# Patient Record
Sex: MALE | Race: WHITE | URBAN - METROPOLITAN AREA
[De-identification: names, ages, dates, MRNs, and addresses within clinical notes are randomized per-mention and may not be internally consistent; named-entity substitution may affect disease eponyms.]

---

## 2017-02-06 ENCOUNTER — IMPORTED ENCOUNTER (OUTPATIENT)
Dept: URBAN - METROPOLITAN AREA CLINIC 50 | Facility: CLINIC | Age: 72
End: 2017-02-06

## 2017-05-05 NOTE — PATIENT DISCUSSION
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 - Plan : Recommend starting Zirgan OU TID. Continue using until next visit. Sample given to patient. RV 1 Week for Follow Up. Sooner if having problems or changes in vision.

## 2017-05-05 NOTE — PATIENT DISCUSSION
(H25.013) Cortical age-related cataract, bilateral - Assesment : Examination revealed Cortical Senile Cataract. With cortical spoking OS. Cataract related changes vs. history amblyopia OS. - Plan : Monitor for changes. Advised patient to call our office with decreased vision or increased symptoms.

## 2017-05-12 NOTE — PATIENT DISCUSSION
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 Cornea clear today OS. - Plan : Recommend d/c Zirgan. Monitor for changes.    RV 1 year Exam.

## 2017-09-26 ENCOUNTER — IMPORTED ENCOUNTER (OUTPATIENT)
Dept: URBAN - METROPOLITAN AREA CLINIC 50 | Facility: CLINIC | Age: 72
End: 2017-09-26

## 2017-10-25 ENCOUNTER — IMPORTED ENCOUNTER (OUTPATIENT)
Dept: URBAN - METROPOLITAN AREA CLINIC 50 | Facility: CLINIC | Age: 72
End: 2017-10-25

## 2018-01-16 NOTE — PATIENT DISCUSSION
1.  Recurrent Erosion of Cornea OS - pain upon opening eyes. Giovani 128ung qhs2. Reviewed ABMD and risk of corneal erosions and if central visual distortion. Recommend clinical observation artificial tear lubrication during the day and Giovani 128ung qhs3. Dry Eye OU:  Continue current management with Artificial Tears. 4.  Cataract OU: Explained how cataracts can effect vision. Recommend clinical observation. The patient was advised to contact us if any change or worsening of vision. 5. Return for an appointment in 1 week for office call. with Dr. Anna Nuñez.

## 2018-01-23 NOTE — PATIENT DISCUSSION
1.  Recurrent Erosion of Cornea OS - Much improved on drops and Giovani 128ung qhs IF fails ointment will need SK2. Reviewed ABMD and risk of corneal erosions and if central visual distortion. Recommend clinical observation artificial tear lubrication during the day and Giovani 128ung qhs3. Dry Eye OU:  Continue current management with Artificial Tears. 4.  Cataract OU: Explained how cataracts can effect vision. Recommend clinical observation. The patient was advised to contact us if any change or worsening of vision. Return for an appointment in 3 months for office call. with Dr. Roxanne Lizarraga.

## 2018-02-09 ENCOUNTER — IMPORTED ENCOUNTER (OUTPATIENT)
Dept: URBAN - METROPOLITAN AREA CLINIC 50 | Facility: CLINIC | Age: 73
End: 2018-02-09

## 2018-03-16 NOTE — PATIENT DISCUSSION
1.  Pt is symptomatic despite using lexi qhs. Discussed the risks benefits and alternatives to superficial keratectomy including infection success rate and recurrence. Schedule if desired. SK with miya sulaiman. Valium 10 mg.2. Reviewed ABMD and risk of corneal erosions and if central visual distortion. Recommend clinical observation artificial tear lubrication during the day and Giovani 128ung qhs3. Return for an appointment for Minor Procedure. with Dr. Lynsey Staley.

## 2018-03-26 NOTE — PATIENT DISCUSSION
Pt is symptomatic despite using lexi qhs. Discussed the risks benefits and alternatives to superficial keratectomy including infection success rate and recurrence. Pt tolerated procedure well. F/u drops and instructions reviewed with . Besivance lotemax prolensa tearsReturn for an appointment for post op exam. with Dr. Juan C Hancock.

## 2018-04-03 NOTE — PATIENT DISCUSSION
Post Operative: Doing well po drops as instructed tears prn. Call with any problems. DC Prolensa and besivance lotemax 2x/d for a week then stop NaCl lexi qhs for 3 monthsReturn for an appointment in 3 weeks for post op exam. MRx. with Dr. Thiago Staley.

## 2018-04-23 NOTE — PATIENT DISCUSSION
Post Operative: Doing well tears 2-3x/d NaCl lexi qhsCataract no improvement with refraction t/c surgery if worsens. Return for an appointment in 3 months for office call MRX. with Dr. Paola Castanon.

## 2018-05-04 NOTE — PATIENT DISCUSSION
Post Operative: Doing well surface improving NaCl OS lexi trial dc in 1-2 months. tears prn. Call with any problems. Return for an appointment in 3 months for office call. with Dr. Rodrick Peñaloza.

## 2018-08-10 NOTE — PATIENT DISCUSSION
Cataract OS: Explained how cataracts can effect vision. Recommend clinical observation. The patient was advised to contact us if any change or worsening of vision.

## 2018-08-10 NOTE — PATIENT DISCUSSION
Recurrent Erosion of Cornea OS - pain upon opening eyes improved greatly after SK but still with some mild irritation when opening eyes. Would restart  Giovani 128ung qhs for 1-2 months.

## 2019-04-04 ENCOUNTER — IMPORTED ENCOUNTER (OUTPATIENT)
Dept: URBAN - METROPOLITAN AREA CLINIC 50 | Facility: CLINIC | Age: 74
End: 2019-04-04

## 2019-05-23 NOTE — PATIENT DISCUSSION
1.  Recurrent Erosion of Cornea OD -  pain upon opening eyes. Giovani 128ung qhs gel drops during day. Corneal eval OD with Dr. Deo Moore. T/C SK OD. 2. Combined Types of Cataract OU: Explained how cataracts can effect vision. Recommend clinical observation. The patient was advised to contact us if any change or worsening of vision.

## 2019-06-18 NOTE — PATIENT DISCUSSION
1.  Pt is symptomatic despite using lexi qhs. Discussed the risks benefits and alternatives to superficial keratectomy including infection success rate and recurrence. Schedule if desired. SK OD with miya sulaiman polishing of surface2. Recurrent Erosion of Cornea OU - pain upon opening eyes. Better os since SK last year  but still with mild symptoms. Restart Giovani 128ung qhs OS3. Combined Types of Cataract OU: Explained how cataracts can effect vision. Recommend clinical observation. The patient was advised to contact us if any change or worsening of vision. 4. Dry Eye OU:  Continue current management with Artificial Tears.

## 2019-07-02 NOTE — PATIENT DISCUSSION
Post Operative: Doing well po drops as instructed. tears prn Call with any problems. Doing well after SK DC besivance prolensa and Lotemax. Tears 4x/d Giovani 128ung qhs ou. Pt may need lexi chronic she has mild symptoms OS after SK without lexi. Return for an appointment in 1 month for post op exam. with Dr. Tracy Mcmanus.

## 2020-03-09 ENCOUNTER — IMPORTED ENCOUNTER (OUTPATIENT)
Dept: URBAN - METROPOLITAN AREA CLINIC 50 | Facility: CLINIC | Age: 75
End: 2020-03-09

## 2020-10-29 NOTE — PATIENT DISCUSSION
1. Combined Types of Cataract OU: Explained how cataracts can effect vision. Recommend clinical observation. The patient was advised to contact us if any change or worsening of vision. 2. Dry Eye OU:  Continue current management with Artificial Tears. 3.  Recurrent Erosion of Cornea OS - pain upon opening eyes. Giovani Carmonaung qhs hs. Eval with Dr. Sidra Shields as still symptomatic with small chronic defect.  4.  Refractive error Glasses change optional.

## 2021-04-02 ENCOUNTER — IMPORTED ENCOUNTER (OUTPATIENT)
Dept: URBAN - METROPOLITAN AREA CLINIC 50 | Facility: CLINIC | Age: 76
End: 2021-04-02

## 2021-06-14 ASSESSMENT — VISUAL ACUITY
OD_OTHER: 20/50. 20/70.
OS_BAT: 20/40
OS_CC: 20/20-1
OD_CC: 20/20-1
OD_OTHER: 20/20. 20/25.
OD_OTHER: 20/30.
OS_CC: 20/20
OS_CC: 20/25
OD_CC: 20/20
OS_OTHER: 20/25. 20/25.
OS_OTHER: 20/80. 20/100.
OS_BAT: 20/25
OS_BAT: 20/100
OD_CC: J1+
OD_CC: 20/25-
OD_OTHER: 20/80. 20/100.
OD_CC: 20/20
OD_CC: 20/20-
OS_OTHER: 20/100. >20/400.
OD_CC: 20/20
OS_BAT: 20/80
OD_OTHER: 20/70. 20/80.
OD_BAT: 20/20
OD_CC: 20/20
OS_CC: 20/25
OS_CC: 20/25-
OD_BAT: 20/50
OS_CC: J1+
OS_OTHER: 20/40. 20/50.
OS_CC: 20/30+
OD_CC: J1+
OS_OTHER: 20/40.
OS_OTHER: 20/25. 20/40.
OD_BAT: 20/20
OS_CC: J1+
OD_CC: J1+
OS_BAT: 20/25
OS_CC: 20/20
OS_CC: J1+
OD_BAT: 20/80
OD_OTHER: 20/20. 20/25.
OD_BAT: 20/70

## 2021-06-14 ASSESSMENT — TONOMETRY
OS_IOP_MMHG: 12
OD_IOP_MMHG: 12
OD_IOP_MMHG: 13
OS_IOP_MMHG: 14
OS_IOP_MMHG: 11
OD_IOP_MMHG: 14
OD_IOP_MMHG: 14
OS_IOP_MMHG: 14
OS_IOP_MMHG: 12
OS_IOP_MMHG: 14
OD_IOP_MMHG: 13
OD_IOP_MMHG: 13
OS_IOP_MMHG: 15
OD_IOP_MMHG: 12

## 2021-10-14 NOTE — PATIENT DISCUSSION
Reviewed ABMD and risk of corneal erosions and if central visual distortion.   Recommend clinical observation artificial tear lubrication during the day and Giovani 128ung qhs

## 2022-01-13 NOTE — PATIENT DISCUSSION
1.  Dry Eye OU/ Corneal erosions:  Continue current management with Gels and Giovani 128. F/U with Dr. Deo Moore.   2.  Combined Types of Cataract OS: Cat eval OS/OD with Dr. Deo Moore

## 2022-03-10 NOTE — PATIENT DISCUSSION
1.  Discussed the risks benefits alternatives and limitations of cataract surgery including infection bleeding loss of vision retinal tears detachment. The patient stated a full understanding and a desire to proceed with the procedure in both eyes. Refractive options were reviewed. Patient has elected to be optimized for distance vision in both eyes. The patient will still need glasses for reading and to possibly fine tune distance vision. Sched KPEIOL OS/OD 2. Dry Eye OU:  Continue current management with Artificial Tears. 3.  S/P SK for ABMD with stable surface4. Followup for Admit. with Dr. Rodrick Peñaloza.

## 2022-04-11 NOTE — PATIENT DISCUSSION
The patient feels that the cataract is significantly impacting daily activities and has elected cataract surgery. The risks, benefits, and alternatives to surgery were discussed. The patient elects to proceed with surgery. Pt declines toric will wear glasses for best vision.

## 2022-05-04 NOTE — PATIENT DISCUSSION
Recurrent Erosion of Cornea OD -  Bandage CL. Tobramycin sol at least QID. F/U 3 days. Spinal complete at 0942 per jacquelyn hall

## 2023-01-30 ENCOUNTER — COMPREHENSIVE EXAM (OUTPATIENT)
Dept: URBAN - METROPOLITAN AREA CLINIC 53 | Facility: CLINIC | Age: 78
End: 2023-01-30

## 2023-01-30 DIAGNOSIS — H25.13: ICD-10-CM

## 2023-01-30 DIAGNOSIS — H43.813: ICD-10-CM

## 2023-01-30 DIAGNOSIS — H02.831: ICD-10-CM

## 2023-01-30 PROCEDURE — 92015 DETERMINE REFRACTIVE STATE: CPT

## 2023-01-30 PROCEDURE — 92014 COMPRE OPH EXAM EST PT 1/>: CPT

## 2023-01-30 ASSESSMENT — VISUAL ACUITY
OS_GLARE: 20/25
OU_CC: J1+(-1)
OD_CC: 20/30-1
OS_CC: 20/30
OD_GLARE: 20/25
OS_PH: 20/25
OD_PH: 20/25-2

## 2023-01-30 ASSESSMENT — TONOMETRY
OS_IOP_MMHG: 12
OD_IOP_MMHG: 12

## 2024-02-05 ENCOUNTER — COMPREHENSIVE EXAM (OUTPATIENT)
Dept: URBAN - METROPOLITAN AREA CLINIC 53 | Facility: CLINIC | Age: 79
End: 2024-02-05

## 2024-02-05 DIAGNOSIS — H02.834: ICD-10-CM

## 2024-02-05 DIAGNOSIS — H02.831: ICD-10-CM

## 2024-02-05 DIAGNOSIS — H43.813: ICD-10-CM

## 2024-02-05 DIAGNOSIS — H52.4: ICD-10-CM

## 2024-02-05 DIAGNOSIS — H25.13: ICD-10-CM

## 2024-02-05 PROCEDURE — 92014 COMPRE OPH EXAM EST PT 1/>: CPT

## 2024-02-05 PROCEDURE — 92134 CPTRZ OPH DX IMG PST SGM RTA: CPT

## 2024-02-05 PROCEDURE — 92015 DETERMINE REFRACTIVE STATE: CPT

## 2024-02-05 ASSESSMENT — KERATOMETRY
OD_AXISANGLE_DEGREES: 175
OS_AXISANGLE_DEGREES: 008
OD_AXISANGLE2_DEGREES: 85
OD_K2POWER_DIOPTERS: 44.50
OS_AXISANGLE2_DEGREES: 98
OD_K1POWER_DIOPTERS: 42.75
OS_K1POWER_DIOPTERS: 42.50
OS_K2POWER_DIOPTERS: 43.75

## 2024-02-05 ASSESSMENT — TONOMETRY
OS_IOP_MMHG: 12
OD_IOP_MMHG: 12

## 2024-02-05 ASSESSMENT — VISUAL ACUITY
OS_GLARE: 20/25
OS_CC: 20/25-2
OD_GLARE: 20/30
OS_GLARE: 20/25
OD_GLARE: 20/30
OU_CC: J1+@14"
OD_CC: 20/25-2

## 2025-03-03 ENCOUNTER — CONSULTATION/EVALUATION (OUTPATIENT)
Age: 80
End: 2025-03-03

## 2025-03-03 DIAGNOSIS — H43.813: ICD-10-CM

## 2025-03-03 DIAGNOSIS — H02.834: ICD-10-CM

## 2025-03-03 DIAGNOSIS — H25.13: ICD-10-CM

## 2025-03-03 DIAGNOSIS — H52.4: ICD-10-CM

## 2025-03-03 DIAGNOSIS — H02.831: ICD-10-CM

## 2025-03-03 PROCEDURE — 99214 OFFICE O/P EST MOD 30 MIN: CPT

## 2025-03-03 PROCEDURE — 92015 DETERMINE REFRACTIVE STATE: CPT
